# Patient Record
Sex: MALE | Race: WHITE | Employment: FULL TIME | URBAN - METROPOLITAN AREA
[De-identification: names, ages, dates, MRNs, and addresses within clinical notes are randomized per-mention and may not be internally consistent; named-entity substitution may affect disease eponyms.]

---

## 2022-11-25 ENCOUNTER — HOSPITAL ENCOUNTER (EMERGENCY)
Age: 67
Discharge: HOME | End: 2022-11-25
Attending: EMERGENCY MEDICINE
Payer: COMMERCIAL

## 2022-11-25 ENCOUNTER — APPOINTMENT (OUTPATIENT)
Dept: GENERAL RADIOLOGY | Age: 67
End: 2022-11-25
Attending: EMERGENCY MEDICINE
Payer: COMMERCIAL

## 2022-11-25 VITALS
BODY MASS INDEX: 42.05 KG/M2 | DIASTOLIC BLOOD PRESSURE: 90 MMHG | OXYGEN SATURATION: 95 % | SYSTOLIC BLOOD PRESSURE: 192 MMHG | WEIGHT: 315 LBS | TEMPERATURE: 98 F | HEART RATE: 70 BPM | RESPIRATION RATE: 18 BRPM

## 2022-11-25 DIAGNOSIS — L03.019 FELON OF FINGER: Primary | ICD-10-CM

## 2022-11-25 PROCEDURE — 74011000250 HC RX REV CODE- 250: Performed by: EMERGENCY MEDICINE

## 2022-11-25 PROCEDURE — 99283 EMERGENCY DEPT VISIT LOW MDM: CPT

## 2022-11-25 PROCEDURE — 73140 X-RAY EXAM OF FINGER(S): CPT

## 2022-11-25 PROCEDURE — 75810000289 HC I&D ABSCESS SIMP/COMP/MULT

## 2022-11-25 PROCEDURE — 74011250637 HC RX REV CODE- 250/637: Performed by: EMERGENCY MEDICINE

## 2022-11-25 PROCEDURE — 75810000451 HC DRAIN ABSC FINGER SIMPLE

## 2022-11-25 RX ORDER — CEPHALEXIN 250 MG/1
500 CAPSULE ORAL
Status: COMPLETED | OUTPATIENT
Start: 2022-11-25 | End: 2022-11-25

## 2022-11-25 RX ORDER — CEPHALEXIN 500 MG/1
500 CAPSULE ORAL 2 TIMES DAILY
Qty: 14 CAPSULE | Refills: 0 | Status: SHIPPED | OUTPATIENT
Start: 2022-11-25 | End: 2022-12-02

## 2022-11-25 RX ORDER — LIDOCAINE HYDROCHLORIDE 10 MG/ML
5 INJECTION, SOLUTION EPIDURAL; INFILTRATION; INTRACAUDAL; PERINEURAL ONCE
Status: COMPLETED | OUTPATIENT
Start: 2022-11-25 | End: 2022-11-25

## 2022-11-25 RX ADMIN — LIDOCAINE HYDROCHLORIDE 5 ML: 10 INJECTION, SOLUTION EPIDURAL; INFILTRATION; INTRACAUDAL; PERINEURAL at 20:55

## 2022-11-25 RX ADMIN — CEPHALEXIN 500 MG: 250 CAPSULE ORAL at 21:33

## 2022-11-25 NOTE — ED TRIAGE NOTES
ED triage note: ambulatory with a steady gait. Patient states 2 days ago got a splinter in right middle finger. States tried to get it out but thinks something still in there. States last tetanus shot was greater than 5 years ago. Redness and swelling noted to finger. Patient states if he pushes on the finger puss comes out.

## 2022-11-26 NOTE — ED NOTES
Pt discharged to home in [place], states understanding of dc instructions, follow up, wound care, rx x 1 given;

## 2022-11-26 NOTE — ED PROVIDER NOTES
HPI   77-year-old male with a past medical history of hypertension and TIA who presents to the emergency department due to swelling and redness of the right middle finger. 2 days ago the patient was doing some gardening. When he picked up a weed he got poked in the right finger by what he thinks was a thorn. He had pain at that time which initially better but then reoccurred. He has had redness and swelling on the distal aspect of the finger. His wife was able to express some pus out of it but he still continues to have pain and swelling. No fevers. No direct trauma. Past Medical History:   Diagnosis Date    Hypertension     Stroke [facility])     TIA April 2011       Past Surgical History:   Procedure Laterality Date    HX APPENDECTOMY           No family history on file. Social History     Socioeconomic History    Marital status:      Spouse name: Not on file    Number of children: Not on file    Years of education: Not on file    Highest education level: Not on file   Occupational History    Not on file   Tobacco Use    Smoking status: Never    Smokeless tobacco: Not on file   Substance and Sexual Activity    Alcohol use: No    Drug use: No    Sexual activity: Yes     Partners: Female   Other Topics Concern    Not on file   Social History Narrative    Not on file     Social Determinants of Health     Financial Resource Strain: Not on file   Food Insecurity: Not on file   Transportation Needs: Not on file   Physical Activity: Not on file   Stress: Not on file   Social Connections: Not on file   Intimate Partner Violence: Not on file   Housing Stability: Not on file         ALLERGIES: Ampicillin and Aspartame    Review of Systems  A complete review of systems was performed and all systems reviewed are negative unless otherwise documented in the [facility].   Vitals:    11/25/22 1628   BP: (!) 192/90   Pulse: 70   Resp: 18   Temp: 98 °F (36.7 °C)   SpO2: 95%   Weight: 152.6 kg (336 lb 6.8 oz)            Physical Exam  Constitutional:       Comments: Not acutely distressed or ill-appearing   Eyes:      General: No scleral icterus. Extraocular Movements: Extraocular movements intact. Neck:      Comments: Trachea midline  Cardiovascular:      Comments: Regular rate and rhythm. Normal capillary refill in the distal aspect of the right middle finger  Pulmonary:      Effort: Pulmonary effort is normal. No respiratory distress. Musculoskeletal:         General: No deformity. Normal range of motion. Cervical back: Normal range of motion. Comments: Erythema and swelling on the distal end dorsal aspect of the right middle finger that extends to about the DIP joint. This area is fluctuant and tender. There is a central wound that is healed. Normal range of motion of the right middle finger. No tenderness over the flexor tendon sheath on the right   Skin:     General: Skin is warm and dry. Neurological:      Comments: Awake and alert. GCS 15        MDM  73-year-old man who presents with the above chief complaint. He is bit hypertensive but otherwise his vitals are stable. He has swelling and erythema on the distal aspect of the right finger dorsally. He has tenderness there. There is a central wound that is since healed over. The swelling and erythema extends to about the DIP joint but not proximally from there. X-ray shows no foreign bodies. I attempted needle aspiration and a small incision with no significant purulence drained from it. Clinically he has a felon. He is going to be started on antibiotics. He will be referred to hand surgery for reevaluation to make sure his symptoms are improving with antibiotics. He was discharged in stable condition.        I&D Abcess Simple    Date/Time: 11/26/2022 6:51 AM  Performed by: Jessica Jackson MD  Authorized by: Jessica Jackson MD     Consent:     Consent obtained:  Verbal    Consent given by:  Patient    Risks discussed:  Bleeding, pain and incomplete drainage    Alternatives discussed:  Alternative treatment  Universal protocol:     Patient identity confirmed:  Provided demographic data  Location:     Indications for incision and drainage: Felon. Location: Right middle finger. Pre-procedure details:     Skin preparation:  Chlorhexidine  Anesthesia:     Anesthesia method:  Local infiltration    Local anesthetic:  Lidocaine 1% w/o epi  Procedure type:     Complexity:  Simple  Procedure details:     Ultrasound guidance: no      Needle aspiration: yes      Needle size:  20 G    Incision types:  Stab incision    Incision depth:  Dermal    Drainage:  Bloody    Drainage amount:  Scant    Wound treatment: Band-Aid placed.   Post-procedure details:     Procedure completion:  Tolerated well, no immediate complications

## 2022-11-26 NOTE — DISCHARGE INSTRUCTIONS
Return with any new or worsening symptoms. Please follow-up with one of the hand surgeons from [facility]. Take the antibiotics as directed.

## 2022-11-26 NOTE — ED NOTES
Bedside and Verbal shift change report given to KAREN Yañez (oncoming nurse) by Asif Barr (offgoing nurse). Report included the following information SBAR, ED Summary, Intake/Output, MAR and Recent Results.

## 2025-07-31 ENCOUNTER — HOSPITAL ENCOUNTER (EMERGENCY)
Facility: HOSPITAL | Age: 70
Discharge: HOME | End: 2025-07-31
Attending: EMERGENCY MEDICINE
Payer: COMMERCIAL

## 2025-07-31 VITALS
TEMPERATURE: 97.5 F | HEIGHT: 75 IN | DIASTOLIC BLOOD PRESSURE: 81 MMHG | HEART RATE: 64 BPM | WEIGHT: 315 LBS | OXYGEN SATURATION: 95 % | BODY MASS INDEX: 39.17 KG/M2 | SYSTOLIC BLOOD PRESSURE: 169 MMHG | RESPIRATION RATE: 16 BRPM

## 2025-07-31 DIAGNOSIS — L03.116 CELLULITIS OF LEFT LOWER EXTREMITY: Primary | ICD-10-CM

## 2025-07-31 PROCEDURE — 6370000000 HC RX 637 (ALT 250 FOR IP): Performed by: EMERGENCY MEDICINE

## 2025-07-31 PROCEDURE — 99283 EMERGENCY DEPT VISIT LOW MDM: CPT

## 2025-07-31 RX ORDER — CEPHALEXIN 500 MG/1
500 CAPSULE ORAL 3 TIMES DAILY
Qty: 29 CAPSULE | Refills: 0 | Status: SHIPPED | OUTPATIENT
Start: 2025-07-31 | End: 2025-08-10

## 2025-07-31 RX ORDER — CEPHALEXIN 500 MG/1
500 CAPSULE ORAL 3 TIMES DAILY
Qty: 29 CAPSULE | Refills: 0 | Status: SHIPPED | OUTPATIENT
Start: 2025-07-31 | End: 2025-07-31

## 2025-07-31 RX ADMIN — CEPHALEXIN 500 MG: 250 CAPSULE ORAL at 17:37

## 2025-07-31 ASSESSMENT — LIFESTYLE VARIABLES
HOW MANY STANDARD DRINKS CONTAINING ALCOHOL DO YOU HAVE ON A TYPICAL DAY: PATIENT DOES NOT DRINK
HOW OFTEN DO YOU HAVE A DRINK CONTAINING ALCOHOL: NEVER

## 2025-07-31 NOTE — ED PROVIDER NOTES
Extraocular Movements: Extraocular movements intact.      Conjunctiva/sclera: Conjunctivae normal.      Pupils: Pupils are equal, round, and reactive to light.   Cardiovascular:      Rate and Rhythm: Normal rate and regular rhythm.      Heart sounds: Normal heart sounds.   Pulmonary:      Effort: Pulmonary effort is normal.      Breath sounds: Normal breath sounds.   Abdominal:      General: There is no distension.      Palpations: Abdomen is soft.      Tenderness: There is no abdominal tenderness.   Musculoskeletal:         General: No tenderness.      Cervical back: Neck supple.        Legs:    Skin:     General: Skin is warm and dry.   Neurological:      General: No focal deficit present.      Mental Status: He is alert and oriented to person, place, and time.             EMERGENCY DEPARTMENT COURSE and DIFFERENTIAL DIAGNOSIS/MDM:   Vitals:    Vitals:    07/31/25 1715   BP: (!) 204/93   Pulse: 71   Resp: 16   Temp: 97.5 °F (36.4 °C)   TempSrc: Oral   SpO2: 96%   Weight: (!) 157.6 kg (347 lb 7.1 oz)   Height: 1.905 m (6' 3\")         Medical Decision Making  Risk  Prescription drug management.      70-year-old male presents with left lower leg redness and swelling.  Appears to have cellulitis to left lower leg.  Afebrile with vital signs stable in no distress.  Given dose of Keflex in the ED which she states that he has been able to tolerate without difficulty previously and Rx course of same.  Recommended PCP follow-up for further evaluation and return precautions were given for worsening or concerns.  This was discussed with the patient at the bedside and he stated with understanding and agreement.      REASSESSMENT          CONSULTS:  None    PROCEDURES:     Procedures            (Please note that portions of this note were completed with a voice recognition program.  Efforts were made to edit the dictations but occasionally words are mis-transcribed.)    Zhao Navarro DO (electronically signed)  Emergency

## 2025-07-31 NOTE — ED TRIAGE NOTES
Pt states thinks he was bitten by a horse fly on Monday, c/o swelling to the left foot/ankle. States not getting any better.